# Patient Record
Sex: FEMALE | Race: OTHER | HISPANIC OR LATINO | URBAN - METROPOLITAN AREA
[De-identification: names, ages, dates, MRNs, and addresses within clinical notes are randomized per-mention and may not be internally consistent; named-entity substitution may affect disease eponyms.]

---

## 2017-10-05 ENCOUNTER — EMERGENCY (EMERGENCY)
Facility: HOSPITAL | Age: 32
LOS: 1 days | Discharge: HOME | End: 2017-10-05
Attending: EMERGENCY MEDICINE | Admitting: EMERGENCY MEDICINE
Payer: SELF-PAY

## 2017-10-05 VITALS
RESPIRATION RATE: 16 BRPM | TEMPERATURE: 98 F | OXYGEN SATURATION: 97 % | WEIGHT: 72.97 LBS | HEART RATE: 67 BPM | DIASTOLIC BLOOD PRESSURE: 57 MMHG | HEIGHT: 60 IN | SYSTOLIC BLOOD PRESSURE: 98 MMHG

## 2017-10-05 DIAGNOSIS — R10.32 LEFT LOWER QUADRANT PAIN: ICD-10-CM

## 2017-10-05 LAB
ALBUMIN SERPL ELPH-MCNC: 4.2 G/DL — SIGNIFICANT CHANGE UP (ref 3.3–5)
ALP SERPL-CCNC: 65 U/L — SIGNIFICANT CHANGE UP (ref 40–120)
ALT FLD-CCNC: 106 U/L — HIGH (ref 10–45)
ANION GAP SERPL CALC-SCNC: 15 MMOL/L — SIGNIFICANT CHANGE UP (ref 5–17)
APPEARANCE UR: CLEAR — SIGNIFICANT CHANGE UP
APTT BLD: 33.9 SEC — SIGNIFICANT CHANGE UP (ref 27.5–37.4)
AST SERPL-CCNC: 52 U/L — HIGH (ref 10–40)
BASOPHILS NFR BLD AUTO: 0.4 % — SIGNIFICANT CHANGE UP (ref 0–2)
BILIRUB SERPL-MCNC: 0.6 MG/DL — SIGNIFICANT CHANGE UP (ref 0.2–1.2)
BILIRUB UR-MCNC: NEGATIVE — SIGNIFICANT CHANGE UP
BUN SERPL-MCNC: 15 MG/DL — SIGNIFICANT CHANGE UP (ref 7–23)
CALCIUM SERPL-MCNC: 9.7 MG/DL — SIGNIFICANT CHANGE UP (ref 8.4–10.5)
CHLORIDE SERPL-SCNC: 102 MMOL/L — SIGNIFICANT CHANGE UP (ref 96–108)
CO2 SERPL-SCNC: 22 MMOL/L — SIGNIFICANT CHANGE UP (ref 22–31)
COLOR SPEC: YELLOW — SIGNIFICANT CHANGE UP
CREAT SERPL-MCNC: 0.65 MG/DL — SIGNIFICANT CHANGE UP (ref 0.5–1.3)
DIFF PNL FLD: (no result)
EOSINOPHIL NFR BLD AUTO: 5.8 % — SIGNIFICANT CHANGE UP (ref 0–6)
GLUCOSE SERPL-MCNC: 109 MG/DL — HIGH (ref 70–99)
GLUCOSE UR QL: NEGATIVE — SIGNIFICANT CHANGE UP
HCG SERPL-ACNC: <.1 MIU/ML — SIGNIFICANT CHANGE UP
HCT VFR BLD CALC: 39.8 % — SIGNIFICANT CHANGE UP (ref 34.5–45)
HGB BLD-MCNC: 13.6 G/DL — SIGNIFICANT CHANGE UP (ref 11.5–15.5)
INR BLD: 1.14 — SIGNIFICANT CHANGE UP (ref 0.88–1.16)
KETONES UR-MCNC: NEGATIVE — SIGNIFICANT CHANGE UP
LEUKOCYTE ESTERASE UR-ACNC: NEGATIVE — SIGNIFICANT CHANGE UP
LIDOCAIN IGE QN: 31 U/L — SIGNIFICANT CHANGE UP (ref 7–60)
LYMPHOCYTES # BLD AUTO: 32.2 % — SIGNIFICANT CHANGE UP (ref 13–44)
MAGNESIUM SERPL-MCNC: 1.9 MG/DL — SIGNIFICANT CHANGE UP (ref 1.6–2.6)
MCHC RBC-ENTMCNC: 29.3 PG — SIGNIFICANT CHANGE UP (ref 27–34)
MCHC RBC-ENTMCNC: 34.2 G/DL — SIGNIFICANT CHANGE UP (ref 32–36)
MCV RBC AUTO: 85.8 FL — SIGNIFICANT CHANGE UP (ref 80–100)
MONOCYTES NFR BLD AUTO: 7.2 % — SIGNIFICANT CHANGE UP (ref 2–14)
NEUTROPHILS NFR BLD AUTO: 54.4 % — SIGNIFICANT CHANGE UP (ref 43–77)
NITRITE UR-MCNC: NEGATIVE — SIGNIFICANT CHANGE UP
PH UR: 6 — SIGNIFICANT CHANGE UP (ref 5–8)
PLATELET # BLD AUTO: 147 K/UL — LOW (ref 150–400)
POTASSIUM SERPL-MCNC: 3.7 MMOL/L — SIGNIFICANT CHANGE UP (ref 3.5–5.3)
POTASSIUM SERPL-SCNC: 3.7 MMOL/L — SIGNIFICANT CHANGE UP (ref 3.5–5.3)
PROT SERPL-MCNC: 7.6 G/DL — SIGNIFICANT CHANGE UP (ref 6–8.3)
PROT UR-MCNC: NEGATIVE MG/DL — SIGNIFICANT CHANGE UP
PROTHROM AB SERPL-ACNC: 12.7 SEC — SIGNIFICANT CHANGE UP (ref 9.8–12.7)
RBC # BLD: 4.64 M/UL — SIGNIFICANT CHANGE UP (ref 3.8–5.2)
RBC # FLD: 12.4 % — SIGNIFICANT CHANGE UP (ref 10.3–16.9)
SODIUM SERPL-SCNC: 139 MMOL/L — SIGNIFICANT CHANGE UP (ref 135–145)
SP GR SPEC: <=1.005 — SIGNIFICANT CHANGE UP (ref 1–1.03)
UROBILINOGEN FLD QL: 0.2 E.U./DL — SIGNIFICANT CHANGE UP
WBC # BLD: 6.8 K/UL — SIGNIFICANT CHANGE UP (ref 3.8–10.5)
WBC # FLD AUTO: 6.8 K/UL — SIGNIFICANT CHANGE UP (ref 3.8–10.5)

## 2017-10-05 PROCEDURE — 76830 TRANSVAGINAL US NON-OB: CPT | Mod: 26

## 2017-10-05 PROCEDURE — 76856 US EXAM PELVIC COMPLETE: CPT | Mod: 26

## 2017-10-05 PROCEDURE — 99285 EMERGENCY DEPT VISIT HI MDM: CPT

## 2017-10-05 RX ORDER — IOHEXOL 300 MG/ML
50 INJECTION, SOLUTION INTRAVENOUS ONCE
Qty: 0 | Refills: 0 | Status: COMPLETED | OUTPATIENT
Start: 2017-10-05 | End: 2017-10-05

## 2017-10-05 RX ADMIN — IOHEXOL 50 MILLILITER(S): 300 INJECTION, SOLUTION INTRAVENOUS at 22:07

## 2017-10-05 NOTE — ED PROVIDER NOTE - ATTENDING CONTRIBUTION TO CARE
32F c/o LLQ abd pain for 3 months.  states worse today, +nausea, menses started today.  no fevers. no dysuria. no discharge  gen- nad  heent- ncat, clear conj  cv -rrr  lungs -ctab  abd - soft, nt, nd  ext -wwp, no edema  neuro -aox3, steady gait, paulino  no acute abd abnormalities on CT, no torsion on US, recommend GYN f/u

## 2017-10-05 NOTE — ED ADULT TRIAGE NOTE - CHIEF COMPLAINT QUOTE
pelvic pain for 3 month, radiates to back, "pain got worse today, nausea, menstrual period started today"  pt denies any PMH, no hx of surgery, reports recent unprotected intercourse, denies fever, no vomiting, no diarrhea, no fever, no dysuria

## 2017-10-05 NOTE — ED ADULT NURSE NOTE - OBJECTIVE STATEMENT
pt received into spot 2 A&Ox3 ambulatory appears comfortable, is Turkmen speaking only, a  phone was used ID number [id number] pt complaining of intermittent 5/10 lower pelvic pain x3 months that became worse today accompanied by pt's menstrual period that started today with some nausea no vomiting. Pt denies any CP SOB palpitations lightheadedness dizziness weakness fever chills pain/ burning with urination diarrhea constipation. Pt denies any PMH PSH or daily medications. Abd soft nontender nondistended. Pt reports she has had unprotected sex, denies any vaginal discharge. Pt provided cup for urine sample but states she just went to the trestroomand cannot go again right now

## 2017-10-05 NOTE — ED PROVIDER NOTE - MEDICAL DECISION MAKING DETAILS
radiological studies , labs and exam in ED with results relayed to patient ( patient concerned as had sono at another facility but apparently not getting results to nov )

## 2017-10-05 NOTE — ED PROVIDER NOTE - OBJECTIVE STATEMENT
exacerbation of months long left lower quadrant/ pelvic pain and thus to ED denies fever no abdominal surgeries + menses at present  one partner no vaginal d/c

## 2017-10-06 VITALS
RESPIRATION RATE: 18 BRPM | DIASTOLIC BLOOD PRESSURE: 63 MMHG | HEART RATE: 71 BPM | SYSTOLIC BLOOD PRESSURE: 118 MMHG | OXYGEN SATURATION: 98 %

## 2017-10-06 PROCEDURE — 85610 PROTHROMBIN TIME: CPT

## 2017-10-06 PROCEDURE — 76830 TRANSVAGINAL US NON-OB: CPT

## 2017-10-06 PROCEDURE — 74177 CT ABD & PELVIS W/CONTRAST: CPT | Mod: 26

## 2017-10-06 PROCEDURE — 83735 ASSAY OF MAGNESIUM: CPT

## 2017-10-06 PROCEDURE — 81001 URINALYSIS AUTO W/SCOPE: CPT

## 2017-10-06 PROCEDURE — 80053 COMPREHEN METABOLIC PANEL: CPT

## 2017-10-06 PROCEDURE — 83690 ASSAY OF LIPASE: CPT

## 2017-10-06 PROCEDURE — 99284 EMERGENCY DEPT VISIT MOD MDM: CPT | Mod: 25

## 2017-10-06 PROCEDURE — 74177 CT ABD & PELVIS W/CONTRAST: CPT

## 2017-10-06 PROCEDURE — 85025 COMPLETE CBC W/AUTO DIFF WBC: CPT

## 2017-10-06 PROCEDURE — 84702 CHORIONIC GONADOTROPIN TEST: CPT

## 2017-10-06 PROCEDURE — 85730 THROMBOPLASTIN TIME PARTIAL: CPT

## 2017-10-06 PROCEDURE — 96374 THER/PROPH/DIAG INJ IV PUSH: CPT | Mod: XU

## 2017-10-06 PROCEDURE — 76856 US EXAM PELVIC COMPLETE: CPT

## 2017-10-06 RX ORDER — KETOROLAC TROMETHAMINE 30 MG/ML
30 SYRINGE (ML) INJECTION ONCE
Qty: 0 | Refills: 0 | Status: DISCONTINUED | OUTPATIENT
Start: 2017-10-06 | End: 2017-10-06

## 2017-10-06 RX ADMIN — Medication 30 MILLIGRAM(S): at 01:55

## 2017-10-06 RX ADMIN — Medication 30 MILLIGRAM(S): at 01:30

## 2017-10-06 NOTE — ED ADULT NURSE REASSESSMENT NOTE - NS ED NURSE REASSESS COMMENT FT1
pt resting on stretcher appears comfortable, passed a clot as she is on her menstrual period. PA Seo aware pt also c/o 7/10 pelvic pain PA made aware pt medicated per orders will monitor and reassess, pt in NAD

## 2017-10-07 LAB
C TRACH RRNA SPEC QL NAA+PROBE: SIGNIFICANT CHANGE UP
N GONORRHOEA RRNA SPEC QL NAA+PROBE: SIGNIFICANT CHANGE UP
SPECIMEN SOURCE: SIGNIFICANT CHANGE UP

## 2018-05-02 ENCOUNTER — EMERGENCY (EMERGENCY)
Facility: HOSPITAL | Age: 33
LOS: 1 days | Discharge: HOME | End: 2018-05-02
Attending: EMERGENCY MEDICINE | Admitting: EMERGENCY MEDICINE
Payer: SELF-PAY

## 2018-05-02 VITALS
OXYGEN SATURATION: 98 % | HEART RATE: 63 BPM | RESPIRATION RATE: 18 BRPM | DIASTOLIC BLOOD PRESSURE: 62 MMHG | TEMPERATURE: 98 F | SYSTOLIC BLOOD PRESSURE: 112 MMHG | WEIGHT: 147.05 LBS

## 2018-05-02 DIAGNOSIS — N93.8 OTHER SPECIFIED ABNORMAL UTERINE AND VAGINAL BLEEDING: ICD-10-CM

## 2018-05-02 DIAGNOSIS — N93.9 ABNORMAL UTERINE AND VAGINAL BLEEDING, UNSPECIFIED: ICD-10-CM

## 2018-05-02 PROCEDURE — 99284 EMERGENCY DEPT VISIT MOD MDM: CPT | Mod: 25

## 2018-05-02 PROCEDURE — 76830 TRANSVAGINAL US NON-OB: CPT

## 2018-05-02 PROCEDURE — 76830 TRANSVAGINAL US NON-OB: CPT | Mod: 26

## 2018-05-02 PROCEDURE — 85025 COMPLETE CBC W/AUTO DIFF WBC: CPT

## 2018-05-02 PROCEDURE — 80048 BASIC METABOLIC PNL TOTAL CA: CPT

## 2018-05-02 PROCEDURE — 96374 THER/PROPH/DIAG INJ IV PUSH: CPT

## 2018-05-02 PROCEDURE — 99284 EMERGENCY DEPT VISIT MOD MDM: CPT

## 2018-05-02 PROCEDURE — 81001 URINALYSIS AUTO W/SCOPE: CPT

## 2018-05-02 PROCEDURE — 36415 COLL VENOUS BLD VENIPUNCTURE: CPT

## 2018-05-02 RX ORDER — KETOROLAC TROMETHAMINE 30 MG/ML
30 SYRINGE (ML) INJECTION ONCE
Qty: 0 | Refills: 0 | Status: DISCONTINUED | OUTPATIENT
Start: 2018-05-02 | End: 2018-05-02

## 2018-05-02 RX ADMIN — Medication 30 MILLIGRAM(S): at 23:21

## 2018-05-02 NOTE — ED PROVIDER NOTE - GENITOURINARY, MLM
nl ext genitalia, friable appearing cervix w blood at os, no cmt, mild r adnexal ttp w/o mass, mild uterine ttp, l adnexa nontender w/o mass

## 2018-05-02 NOTE — ED ADULT TRIAGE NOTE - CHIEF COMPLAINT QUOTE
pt complaining of abd pain x 1 week with vaginal bleeding and nausea no vomiting. Unsure when last menstrual period

## 2018-05-02 NOTE — ED PROVIDER NOTE - OBJECTIVE STATEMENT
32 yo female  c/o vaginal bleeding and pelvic pain.  Pt w dub in Feb, eval by gyn at that time and told it was hormonal.  Pt received biopsy, u/s and labs at that time but denies treatment w rx.  Pt reports bleeding stopped but recurred in late April and now since last night.  Pt denies h/o ovarian cyst, fibroids.  Pt reports h/o hpv but does not know if she's had a pap smear.  No h/o std, monogamous w asymptomatic partner.  No cp, palpitations, sob, dizziness/weakness.  No meds at home for 9/10 pelvic pain. No dysuria, freq, urgency, abnl discharge prior to bleeding.

## 2018-05-02 NOTE — ED PROVIDER NOTE - MEDICAL DECISION MAKING DETAILS
Pt c/o vag bleeding and pelvic pain w h/o recent dub.  ? dub, ? cervical issue since cervix friable on exam, ? std (low risk), ? ovarian cyst, doubt fibroid since no enlarged uterus on exam.  Plan pain meds, labs, u/s; likely dc to fu as outpt w gyn for repeat eval, pap, etc.

## 2018-05-03 LAB
ANION GAP SERPL CALC-SCNC: 12 MMOL/L — SIGNIFICANT CHANGE UP (ref 5–17)
APPEARANCE UR: (no result)
BACTERIA # UR AUTO: PRESENT /HPF
BASOPHILS NFR BLD AUTO: 0.5 % — SIGNIFICANT CHANGE UP (ref 0–2)
BILIRUB UR-MCNC: (no result)
BUN SERPL-MCNC: 17 MG/DL — SIGNIFICANT CHANGE UP (ref 7–23)
CALCIUM SERPL-MCNC: 10.1 MG/DL — SIGNIFICANT CHANGE UP (ref 8.4–10.5)
CHLORIDE SERPL-SCNC: 103 MMOL/L — SIGNIFICANT CHANGE UP (ref 96–108)
CO2 SERPL-SCNC: 26 MMOL/L — SIGNIFICANT CHANGE UP (ref 22–31)
COLOR SPEC: YELLOW — SIGNIFICANT CHANGE UP
CREAT SERPL-MCNC: 0.77 MG/DL — SIGNIFICANT CHANGE UP (ref 0.5–1.3)
DIFF PNL FLD: (no result)
EOSINOPHIL NFR BLD AUTO: 1.9 % — SIGNIFICANT CHANGE UP (ref 0–6)
EPI CELLS # UR: (no result) /HPF (ref 0–5)
GLUCOSE SERPL-MCNC: 100 MG/DL — HIGH (ref 70–99)
GLUCOSE UR QL: NEGATIVE — SIGNIFICANT CHANGE UP
HCT VFR BLD CALC: 40.3 % — SIGNIFICANT CHANGE UP (ref 34.5–45)
HGB BLD-MCNC: 13.2 G/DL — SIGNIFICANT CHANGE UP (ref 11.5–15.5)
KETONES UR-MCNC: (no result) MG/DL
LEUKOCYTE ESTERASE UR-ACNC: NEGATIVE — SIGNIFICANT CHANGE UP
LYMPHOCYTES # BLD AUTO: 36.9 % — SIGNIFICANT CHANGE UP (ref 13–44)
MCHC RBC-ENTMCNC: 27.4 PG — SIGNIFICANT CHANGE UP (ref 27–34)
MCHC RBC-ENTMCNC: 32.8 G/DL — SIGNIFICANT CHANGE UP (ref 32–36)
MCV RBC AUTO: 83.8 FL — SIGNIFICANT CHANGE UP (ref 80–100)
MONOCYTES NFR BLD AUTO: 6.4 % — SIGNIFICANT CHANGE UP (ref 2–14)
NEUTROPHILS NFR BLD AUTO: 54.3 % — SIGNIFICANT CHANGE UP (ref 43–77)
NITRITE UR-MCNC: NEGATIVE — SIGNIFICANT CHANGE UP
PH UR: 5 — SIGNIFICANT CHANGE UP (ref 5–8)
PLATELET # BLD AUTO: 190 K/UL — SIGNIFICANT CHANGE UP (ref 150–400)
POTASSIUM SERPL-MCNC: 3.8 MMOL/L — SIGNIFICANT CHANGE UP (ref 3.5–5.3)
POTASSIUM SERPL-SCNC: 3.8 MMOL/L — SIGNIFICANT CHANGE UP (ref 3.5–5.3)
PROT UR-MCNC: 100 MG/DL
RBC # BLD: 4.81 M/UL — SIGNIFICANT CHANGE UP (ref 3.8–5.2)
RBC # FLD: 14 % — SIGNIFICANT CHANGE UP (ref 10.3–16.9)
RBC CASTS # UR COMP ASSIST: (no result) /HPF
SODIUM SERPL-SCNC: 141 MMOL/L — SIGNIFICANT CHANGE UP (ref 135–145)
SP GR SPEC: >=1.03 — SIGNIFICANT CHANGE UP (ref 1–1.03)
UROBILINOGEN FLD QL: 0.2 E.U./DL — SIGNIFICANT CHANGE UP
WBC # BLD: 7.3 K/UL — SIGNIFICANT CHANGE UP (ref 3.8–10.5)
WBC # FLD AUTO: 7.3 K/UL — SIGNIFICANT CHANGE UP (ref 3.8–10.5)
WBC UR QL: < 5 /HPF — SIGNIFICANT CHANGE UP

## 2019-07-29 NOTE — ED ADULT TRIAGE NOTE - AS O2 DELIVERY
[Patient reported mammogram was normal] : Patient reported mammogram was normal [Patient reported PAP Smear was normal] : Patient reported PAP Smear was normal [Language] : difficulty with language [Learning/Retaining New Information] : difficulty learning/retaining new information [Reasoning] : difficulty with reasoning [Handling Complex Tasks] : difficulty handling complex tasks [Spatial Ability and Orientation] : difficulty with spatial ability and orientation [With Family] : lives with family [College] : College [] :  [Feels Safe at Home] : Feels safe at home [Fully functional (bathing, dressing, toileting, transferring, walking, feeding)] : Fully functional (bathing, dressing, toileting, transferring, walking, feeding) [Fully functional (using the telephone, shopping, preparing meals, housekeeping, doing laundry, using] : Fully functional and needs no help or supervision to perform IADLs (using the telephone, shopping, preparing meals, housekeeping, doing laundry, using transportation, managing medications and managing finances) [Smoke Detector] : smoke detector [Carbon Monoxide Detector] : carbon monoxide detector [Safety elements used in home] : safety elements used in home [Seat Belt] :  uses seat belt [Behavior] : denies difficulty with behavior [Change in mental status noted] : No change in mental status noted [Guns at Home] : no guns at home [Reports changes in dental health] : Reports no changes in dental health [PapSmearDate] : 6/18 [MammogramDate] : 2/18 room air

## 2021-09-07 ENCOUNTER — EMERGENCY (EMERGENCY)
Facility: HOSPITAL | Age: 36
LOS: 1 days | Discharge: HOME | End: 2021-09-07
Attending: EMERGENCY MEDICINE | Admitting: EMERGENCY MEDICINE
Payer: MEDICAID

## 2021-09-07 VITALS
TEMPERATURE: 97 F | HEART RATE: 67 BPM | DIASTOLIC BLOOD PRESSURE: 64 MMHG | RESPIRATION RATE: 18 BRPM | HEIGHT: 60 IN | SYSTOLIC BLOOD PRESSURE: 99 MMHG | WEIGHT: 139.99 LBS | OXYGEN SATURATION: 100 %

## 2021-09-07 VITALS — SYSTOLIC BLOOD PRESSURE: 105 MMHG | DIASTOLIC BLOOD PRESSURE: 65 MMHG

## 2021-09-07 DIAGNOSIS — M54.9 DORSALGIA, UNSPECIFIED: ICD-10-CM

## 2021-09-07 DIAGNOSIS — R07.89 OTHER CHEST PAIN: ICD-10-CM

## 2021-09-07 DIAGNOSIS — U07.1 COVID-19: ICD-10-CM

## 2021-09-07 LAB — SARS-COV-2 RNA SPEC QL NAA+PROBE: SIGNIFICANT CHANGE UP

## 2021-09-07 PROCEDURE — U0003: CPT

## 2021-09-07 PROCEDURE — 71045 X-RAY EXAM CHEST 1 VIEW: CPT

## 2021-09-07 PROCEDURE — U0005: CPT

## 2021-09-07 PROCEDURE — 99283 EMERGENCY DEPT VISIT LOW MDM: CPT | Mod: 25

## 2021-09-07 PROCEDURE — 93005 ELECTROCARDIOGRAM TRACING: CPT

## 2021-09-07 PROCEDURE — 93010 ELECTROCARDIOGRAM REPORT: CPT

## 2021-09-07 PROCEDURE — 71045 X-RAY EXAM CHEST 1 VIEW: CPT | Mod: 26

## 2021-09-07 PROCEDURE — 99285 EMERGENCY DEPT VISIT HI MDM: CPT | Mod: 25

## 2021-09-07 RX ORDER — ACETAMINOPHEN 500 MG
1000 TABLET ORAL ONCE
Refills: 0 | Status: COMPLETED | OUTPATIENT
Start: 2021-09-07 | End: 2021-09-07

## 2021-09-07 RX ORDER — SODIUM CHLORIDE 9 MG/ML
1000 INJECTION INTRAMUSCULAR; INTRAVENOUS; SUBCUTANEOUS ONCE
Refills: 0 | Status: DISCONTINUED | OUTPATIENT
Start: 2021-09-07 | End: 2021-09-07

## 2021-09-07 RX ADMIN — Medication 1000 MILLIGRAM(S): at 20:08

## 2021-09-07 NOTE — ED PROVIDER NOTE - NSFOLLOWUPINSTRUCTIONS_ED_ALL_ED_FT
COVID-19 (CORONAVIRUS DISEASE 2019) - General Information           COVID-19 (Enfermedad por coronavirus 2019)    LO QUE NECESITA SABER:    ¿Qué necesito saber acerca de la enfermedad por coronavirus 2019 (COVID-19)?La COVID-19 es la enfermedad causada por el nuevo coronavirus descubierto por primera vez en diciembre de 2019. Los coronavirus generalmente causan infecciones de las vías respiratorias superiores (nariz, garganta y pulmones), lester un resfriado. El nuevo virus se propaga rápida y fácilmente. El virus puede transmitirse a partir de 2 días antes de que comiencen los síntomas. El virus también waller cambiado en varias formas nuevas (llamadas variantes) desde que se descubrió. Las variantes pueden ser más contagiosas (se propagan fácilmente) que la forma original. Además, algunas pueden causar katelynn enfermedad más grave que otras. Es importante seguir las medidas locales, nacionales y mundiales para protegerse y proteger a los demás de la infección.    ¿Cuáles son los signos y síntomas de la COVID-19?Es posible que no presente ningún signo o síntoma. Los signos y síntomas suelen empezar unos 5 días después de la infección gilbert pueden tardar de 2 a 14 días. Puede sentir lester si tuviera gripe o un resfriado john. Algunos signos y síntomas desaparecen en unos pocos días. Otros pueden durar semanas, meses o posiblemente años. La información sobre COVID-19 todavía se está aprendiendo. Dígale a lopes médico si parveen que se ha infectado gilbert desarrolla signos o síntomas que no se enumeran a continuación:  •Tos      •Falta de aliento o dificultad para respirar que puede llegar a ser grave      •Katelynn fiebre de, al menos, 100.4 °F, o 38 °C (puede ser más baja en los adultos de 65 años o más)      •Escalofríos que pueden incluir temblores      •Dolor muscular, yue corporales o dolor de brina      •El dolor de garganta      •De repente, no ser capaz de probar u oler nada      •Sensación de cansancio físico y mental (fatiga)      •Congestión (de la nariz y la brina) o flujo nasal      •Diarrea, náuseas o vómitos      ¿Cómo se diagnostica la COVID-19?Llame a lopes médico si piensa que puede tener COVID-19. Le dirá lo que debe hacer basándose en maria a síntomas y en las pautas de pruebas de loeps norberto. En general, se puede utilizar lo siguiente:   •Un examen viralmuestra si tiene katelynn infección actualmente. Se rox muestras de la nariz y la garganta, usualmente con hisopos. Es posible que tenga que esperar varios días para obtener los resultados de la prueba. Lopes médico le dirá cómo obtener los resultados. Tendrá que ponerse en cuarentena (mantenerse físicamente alejado de los demás) hasta que obtenga los resultados. Si los resultados muestran que tiene COVID-19, tendrá que continuar hasta que esté pilar. Lopes médico u otro oficial de freddie pueden darle más instrucciones. También tendrá que prevenir otra infección hasta que se sepa si puede contraer COVID-19 de nuevo.      •Katelynn prueba de anticuerposmuestra si tuvo katelynn infección en el pasado. Para esta prueba se utilizan muestras de lakeisha. Los anticuerpos son producidos por el sistema inmunitario para atacar el virus que causa la COVID-19. Los anticuerpos se formarán de 1 a 3 semanas después de que se contagie. No se sabe si los anticuerpos previenen katelynn segunda infección, o por cuánto tiempo katelynn persona podría estar protegida. Si tiene anticuerpos, tendrá que tener cuidado con los demás hasta que se sepa más.      •Tomografías o radiografíaspodrían realizarse para comprobar si existen signos de neumonía. El coronavirus 2019 causa un tipo específico de neumonía, generalmente en ambos pulmones. Las imágenes también pueden utilizarse para comprobar si tiene problemas médicos en otras partes del cuerpo.      ¿Cómo se trata la COVID-19?El tratamiento, lester los anticuerpos monoclonales y el plasma de convaleciente, casillas recibido autorización de uso de emergencia (EUA). [place] significa que podrían administrarse solamente a pacientes hospitalizados con signos y síntomas graves. Se puede usar lo siguiente para controlar maria a síntomas:   •Los síntomas levespodrían mejorar por sí solos. Si no necesita ser tratado en un hospital, se le darán instrucciones para que siga en lopes casa. Controlarán atentamente lopes estado. Deberá estar atento al empeoramiento de los síntomas y buscar atención inmediata si es necesario. Hable con lopes médico acerca de lo siguiente:?Los descongestivosayudan a reducir la congestión nasal y ayudan a respirar más fácilmente. Si abigail pastillas descongestivas, pueden causarle agitación o problemas para dormir. No use aerosol descongestionante por más de unos cuantos días.      ?Los jarabes para la tosayudan a reducir la tos. Pregúntele a lopes médico cuál tipo de medicamento para la tos es mejor para usted.      ?Para aliviar el dolor de garganta,kai gárgaras con agua salada tibia, o use pastillas para la garganta o un aerosol para la garganta. Celine más líquidos para disolver y aflojar la mucosidad y para prevenir la deshidratación.      ?Los RAJANI o el acetaminofenopueden ayudar a bajar la fiebre y aliviar los yue corporales o el dolor de brina. Siga las indicaciones. Si no se rox correctamente, los RAJANI pueden causar sangrado estomacal o daño renal y el acetaminofeno puede dañar hepático.      •Los síntomas severos o potencialmente mortalesse tratan en el hospital. Es posible que usted necesite katelynn combinación de los siguientes:?Los medicamentospueden administrarse para reducir o prevenir la inflamación o combatir el virus. También podría necesitar anticoagulantes para prevenir o tratar los coágulos de lakeisha. Si tiene trombosis venosa profunda (TVP) o embolia pulmonar (PE), isis vez necesite seguir usando anticoagulantes kameron 3 meses.      ?El oxígeno adicionalpodría administrarse si tiene insuficiencia respiratoria. [place] significa que los pulmones no pueden llevar suficiente oxígeno a la lakeisha y a los órganos. El oxígeno extra puede ayudar a prevenir la insuficiencia orgánica.      ?Un respiradorpodría usarse para ayudarlo a respirar.        ¿Qué lucia saber sobre los problemas de freddie que puede causar el virus?Los problemas de freddie graves pueden mejorar o continuar kameron semanas, meses y posiblemente años. Los problemas de freddie que se prolongan pueden denominarse COVID persistente. Cualquier persona puede desarrollar problemas graves a causa del nuevo virus, gilbert el riesgo es mayor si tiene 65 años o más. Un sistema inmunitario débil, la diabetes o katelynn enfermedad cardíaca o pulmonar también pueden aumentar el riesgo. El riesgo también es mayor si usted es o ha sido fumador de cigarrillos. La COVID-19 puede austin lugar a cualquiera de las siguientes situaciones:  •Síndrome multisistémico inflamatorio en adultos (MIS-A) o en niños (MIS-C), que causa inflamación en el corazón, el sistema digestivo, la piel o el cerebro      •Afecciones respiratorias inferiores graves, lester la neumonía o el síndrome de dificultad respiratoria aguda (SDRA).      •Daño a los vasos sanguíneos, lo que provoca coágulos de lakeisha      •Daños en los órganos por falta de oxígeno o por coágulos de lakeisha      •Problemas para dormir      •Problemas para pensar claramente, para recordar información o para concentrarse      •Cambios en el estado de ánimo, depresión o ansiedad      ¿Cómo se propaga el coronavirus 2019?A continuación se indican las formas en que se parveen que se propaga el virus, gilbert es posible que surja más información:   •Las gotitas son la principal forma de propagación de todos los coronavirus.El virus viaja en las gotitas que se hellen cuando katelynn persona habla, tose o estornuda. Las gotitas también pueden flotar en el aire por minutos u horas. La infección se produce cuando respira las gotitas o cuando le tocan los ojos o la nariz. El contacto personal cercano con katelynn persona infectada aumenta el riesgo de infección. [place] significa estar a menos de 6 pies (2 metros) de distancia de la persona kameron al menos 15 minutos en 24 horas.      •El contacto de persona a persona puede propagar el virus.Por ejemplo, katelynn persona con el virus en maria a marcellus puede propagarlo al darle la mano a alguien.      •El virus puede permanecer en objetos y superficies kameron un breve período.Puede infectarse si toca el objeto o la superficie y luego se toca los ojos o la boca.      •Un animal infectado puede ser capaz de infectar a katelynn persona que lo toque.[place] puede ocurrir en mercados vivos o en katelynn bob.      ¿Cómo puede ayudar a reducir el riesgo de COVID-19?La mejor manera de prevenir la infección es evitar a cualquiera que esté infectado, gilbert esto puede ser difícil de lograr. Katelynn persona infectada puede propagar el virus antes de que aparezcan los signos o síntomas, o incluso si los signos o síntomas nunca se desarrollan. Lo siguiente puede ayudar a reducir el riesgo de infección:     Prevenga la infección por COVID-19     •Lávese las marcellus con frecuencia kameron el día.Utilice agua y jabón. Frótese las marcellus enjabonadas, entrelazando los dedos, kameron al menos 20 segundos. Enjuáguese con agua corriente caliente. Séquese las marcellus con katelynn toalla limpia o katelynn toalla de papel. Puede usar un desinfectante para marcellus que contenga alcohol, si no hay agua y jabón disponibles. Enseñe a los niños a lavarse las marcellus y a usar el desinfectante de marcellus.  Lavado de marcellus           •Cúbrase al toser y estornudar.Gire la armida y cúbrase la boca y la nariz con un pañuelo. Deseche el pañuelo. Use el ángulo del brazo si no tiene un pañuelo disponible. Luego lávese las marcellus con agua y jabón o use un desinfectante de marcellus. Enséñeles a los niños a cubrirse al toser o estornudar.      •Use un tapabocas (mascarilla) cuando esté cerca de alguien que no vive en lopes casa.Utilice un tapabocas de david con al menos 2 capas. También puede crear capas colocando un tapabocas de david sobre katelynn máscara no médica desechable. Cúbrase la boca y la nariz. El tapabocas debe ajustarse pilar al navarro de la nariz. Asegúrelo debajo de la barbilla y a los lados de la armida. No use un protector facial de plástico en lugar de un tapabocas. Continúe con el distanciamiento social y lávese las marcellus a menudo. El tapabocas no es un sustituto de otras medidas de seguridad de distanciamiento social.  Cómo usar un tapabocas (mascarilla)           •Siga las pautas de distanciamiento social a nivel local, nacional y mundial.Mantenga al menos 6 pies (2 metros) de distancia entre usted y los demás. También mantenga esta distancia de cualquiera que venga a lopes casa, lester alguien que kai katelynn entrega. Use un tapabocas cuando esté cerca de otros. Deberá usar un tapabocas en restaurantes, tiendas y otros edificios públicos. También necesitará usar un tapabocas en el transporte público, lester el autobús, el metro o el avión. Recuerde que debe utilizar un tapabocas de material grueso o usar 2 tapabocas juntos.      •Acostúmbrese a no tocarse la armida.Si tiene el virus en las marcellus, puede transferirlo a los ojos, la nariz o la boca e infectarse. También puede transferirlo a los objetos, las superficies o las personas. No se toque los ojos, la nariz o la boca sin antes lavarse las marcellus.      •Limpie y desinfecte a menudo los objetos y las superficies de alto contacto.Use toallitas húmedas desinfectantes o kai katelynn solución de 4 cucharaditas de lejía en 1 cuarto de galón (4 tazas) de agua. Limpie y desinfecte aunque piense que nadie que viva o haya entrado en lopes casa esté infectado con el virus.      •Pregunte sobre las vacunas que pudiera necesitar.Reciba la vacuna contra la COVID-19 cuando esté disponible para usted. Las vacunas actuales se administran en forma de inyección en 1 o 2 dosis. Debe recibir la vacuna contra la influenza (gripe) tan pronto lester se lo recomienden cada año, generalmente en septiembre u octubre. Vacúnese contra la neumonía si se recomienda.      ¿Cómo sigo las pautas de distanciamiento social para ayudar a reducir el riesgo de COVID-19?Las normas de distanciamiento social nacionales y locales varían. Las reglas pueden cambiar con el tiempo a medida que se levantan las restricciones. Las restricciones pueden volver a aplicarse si se produce un brote en el lugar donde usted vive. Es importante conocer y seguir todas las reglas de distanciamiento social actuales en lopes área. Las siguientes son reglas generales al respecto:  •Quédese en casa si está enfermo o parveen que puede tener COVID-19.Es importante que se quede en casa si está esperando katelynn ariel para katelynn prueba o los resultados de katelynn prueba. Incluso si no tiene síntomas, puede transmitir el virus a otros.      •Limite los viajes fuera de lopes casa.Kai que le entreguen los alimentos y otros suministros en la chrystal o en otra área, de ser posible. Planifique los viajes fuera de lopes casa para que kai el rowena número de paradas posibles para limitar el contacto personal cercano. [place] nota de los lugares que visita. [place] ayudará a que los rastreadores de contacto notifiquen a otros si usted se infecta.      •Evite el contacto físico cercano con nadie que no viva en lopes casa.No le dé la mano, abrace o bese a katelynn persona lester saludo. Si tiene que usar el transporte público (lester el autobús o el metro), intente sentarse o pararse lejos de los demás. Permita que ingresen a lopes casa solamente las personas necesarias. Use el tapabocas y recuérdeles a los demás que usen un tapabocas. Recuérdeles que se laven las marcellus cuando lleguen y antes de irse. No permita el ingreso de nadie a lopes casa ni vaya usted a otra casa solo de visita. Aunque ninguno se sienta enfermos, el virus puede contagiarse de katelynn persona a otra.      •Evite las reuniones en persona y las multitudes.Las reuniones o multitudes de 10 o más individuos pueden hacer que el virus se propague. Evite los lugares lester parques, playas, eventos deportivos y atracciones turísticas. De ser posible, asista a las fiestas, las comidas festivas, los servicios religiosos y las conferencias en forma virtual (a través de katelynn computadora).      •Pregunte a lopes médico de qué otra forma puede tener las citas.Algunos médicos ofrecen citas por teléfono, video u otros tipos de citas. También puede obtener recetas de maria a medicamentos para varios meses de katelynn vez.      •Manténgase a jaquelin si debe que salir a trabajar.Mantenga la distancia física entre usted y los demás trabajadores tanto lester sea posible. Siga las reglas de lopes empleador para que todos estén a jaquelin.      ¿Qué lucia hacer si tengo COVID-19 y me estoy recuperando en casa?Los médicos le darán instrucciones específicas que debe seguir. Las siguientes son pautas generales para recordarle cómo mantener a los demás a jaquelin hasta que usted esté pilar:   •Lávese las marcellus frecuentemente.Use agua y jabón tanto lester sea posible. Puede usar un desinfectante para marcellus que contenga alcohol, si no hay agua y jabón disponibles. Séquese las marcellus con katelynn toalla limpia o katelynn toalla de papel. No comparta toallas con nadie. Si usa toallas de papel, deséchelas en un cubo de basura recubierto que se guarda en lopes habitación o área. Use un cubo de basura cubierto, si es posible.      •No salga de lopes casa a menos que sea necesario.Pídale a katelynn persona no infectada que le compre los comestibles o insumos, o kai que se los entreguen. No acuda al consultorio del médico sin katelynn ariel.      •Solo tenga un contacto físico cercano con katelynn persona que lo cuide directamente, o con un bebé o lisseth que deba cuidar.Los miembros de la aury y los amigos no deben visitarlo. Si es posible, quédese en un área o habitación separada de lopes casa si vive con otras personas. Nadie debe entrar en el área o en la habitación excepto para brindarle cuidados. Puede visitar a los demás por teléfono, videochat, correo electrónico o sistemas similares.      •Use un tapabocas mientras haya otras personas cerca de usted.[place] puede ayudar a evitar que las gotitas propaguen el virus cuando usted habla, estornuda o tose. Póngase el tapabocas antes de que la persona entre en lopes habitación o área. Recuérdele a la persona que se cubra la nariz y la boca antes de entrar a brindarle cuidados.      •No comparta artículos.No comparta platos, toallas ni otros artículos con nadie. Los artículos deben ser lavados después de usarlos.      •Proteja a lopes bebé.Algunos recién nacidos casillas dado positivo para el virus. Se desconoce si se infectaron antes o después del nacimiento. El riesgo más alto es cuando el recién nacido tiene contacto cercano con katelynn persona infectada. Si está embarazada o amamantando, hable con lopes médico u obstetra sobre cualquier preocupación que tenga. También le dirá cuándo debe traer a lopes bebé para los chequeos y las vacunas. También le dirá qué hacer si parveen que lopes bebé está infectado con el coronavirus. Lávese las marcellus y colóquese un tapabocas limpio antes de amamantar o cuidar al bebé.      •No manipule animales vivos a menos que sea necesario.Algunos animales, incluyendo las mascotas, casillas sido infectados con el nuevo coronavirus. Pídale a alguien que no esté infectado que cuide de lopes mascota hasta que usted esté pilar. Si debe cuidar a katelynn mascota, usa un tapabocas. Lávese las marcellus antes y después de cuidar a lopes mascota. Hable con lopes médico sobre cómo mantener seguro a un animal de servicio, si es necesario.      •Siga las instrucciones de lopes médico para estar cerca de los demás después de recuperarse.No se sabe si katelynn persona recuperada puede transmitir el virus a otros, ni por cuánto tiempo. Lopes médico puede darle instrucciones, lester continuar con el distanciamiento social y usar un tapabocas. Le dirá cuándo podrá volver a estar con otras personas. [place] podría ser de 10 a 20 javed después del inicio de los síntomas o si tuvo katelynn prueba positiva. La mayoría de los síntomas también deberán desaparecer. Lopes médico le proporcionará más información.      ¿Dónde puedo obtener más información?  •Centers for Disease Control and Prevention  [address] [place] [id number]  Phone: [phone]  Web Address: http://www.cdc.gov        ¿Qué lucia hacer si pienso que yo o alguien que conozco está infectado?Kai lo siguiente para proteger a otras personas:   •Si se requiere atención de emergencia,avise al operador de la posible infección, o llame antes y avise al servicio de urgencias.      •Llame a un médicopara recibir instrucciones si los síntomas son leves. Cualquier persona que pueda estar infectada no debe llegar sin llamar noreen. El médico deberá proteger a los miembros del personal y a otros pacientes.      •La persona que puede estar infectada debe usar un tapabocasmientras reciben atención médica. [place] ayudará a reducir el riesgo de infectar a otras personas. Nadie que sea rowena de 2 años, que tenga problemas respiratorios o que no pueda quitárselo debe usar un tapabocas. El médico puede darle instrucciones para cualquier persona que no pueda usar un tapabocas.      Llame al número local de emergencias (911 en los [place]) o al departamento de emergencias si:  •Usted tiene dificultad para respirar o falta de aliento mientras descansa.      •Usted siente presión o dolor en el pecho que dura más de 5 minutos.      •Usted tiene confusión o es difícil despertarlo.      •Maria A labios o armida están azules.      •Usted tiene fiebre de 104 °F (40 °C) o más.      ¿Cuándo lucia llamar a mi médico?  •No tiene síntomas de COVID-19 gilbert tuvo contacto físico cercano dentro de los 14 días con alguien que adan positivo.      •Usted tiene preguntas o inquietudes acerca de lopes condición o cuidado.      ACUERDOS SOBRE LOPES CUIDADO:    Usted tiene el derecho de ayudar a planear olpes cuidado. Aprenda todo lo que pueda sobre lopes condición y lester darle tratamiento. Discuta maria a opciones de tratamiento con maria a médicos para decidir el cuidado que usted desea recibir. Usted siempre tiene el derecho de rechazar el tratamiento.       © Copyright [employer] 2021           back to top                          © Copyright [employer] 2021

## 2021-09-07 NOTE — ED PROVIDER NOTE - OBJECTIVE STATEMENT
36 F co chest ache, body aches, legs, arms, back, headache- loss of taste x 1 day- no f/c  no covid vax- no sig pmh  no ho covid  no n/v, tolerating po  mild-moderate severity  lmp 3 weeks ago

## 2021-09-07 NOTE — ED PROVIDER NOTE - PATIENT PORTAL LINK FT
You can access the FollowMyHealth Patient Portal offered by [facility] by registering at the following website: [email or web address]. By joining [employer]’s FollowMyHealth portal, you will also be able to view your health information using other applications (apps) compatible with our system.

## 2021-09-07 NOTE — ED PROVIDER NOTE - CARE PLAN
1 Principal Discharge DX:	Chest pain  Secondary Diagnosis:	2019 novel coronavirus disease (COVID-19)

## 2021-09-07 NOTE — ED ADULT TRIAGE NOTE - CHIEF COMPLAINT QUOTE
Pt c/o chest pain, headache, and palpitations since last night. No known cardiac hx. Denies shortness of breath, fevers, cough.

## 2021-09-07 NOTE — ED ADULT NURSE NOTE - TEMPLATE LIST FOR HEAD TO TOE ASSESSMENT
REVIEW OF SYSTEMS:  GENERAL:  Patient denies fevers, chills, night sweats, excessive fatigue, anorexia, weight loss, weakness, hot flashes  EYES:  Patient denies blurred vision, double vision, pain, burning & itching.   NEUROLOGIC:  Patient denies headache, dizziness, numbness, tingling, loss of balance, insomnia.  GASTROINTESTINAL:  Patient denies nausea, vomiting, diarrhea, abdominal pain, constipation, blood in stool, indigestion/heartburn.  CARDIOVASCULAR:  Patient denies chest pain, palpitations.  SKIN:  Patient denies skin rashes, bruising, persistent itching.  MUSCULOSKELETAL:  Patient denies bone pain, leg and ankle swelling, but complains of:  joint pain.  ENT/MOUTH:  Patient denies dry mouth, sores on tongue, mouth sores, sinus drainage, hearing loss, but complains of:  dysphagia and sore throat  :  Patient denies urgency, painful urination, urinary frequency, blood in urine, nocturia  RESPIRATORY:  Patient denies wheezing, shortness of breath, but complains of frequent  Dry  cough.  PSYCH: Patient denies anxiety, depression   Communicable/Infectious

## 2022-01-06 ENCOUNTER — EMERGENCY (EMERGENCY)
Facility: HOSPITAL | Age: 37
LOS: 1 days | Discharge: HOME | End: 2022-01-06
Attending: EMERGENCY MEDICINE | Admitting: EMERGENCY MEDICINE
Payer: MEDICAID

## 2022-01-06 VITALS
HEART RATE: 69 BPM | RESPIRATION RATE: 18 BRPM | WEIGHT: 154.98 LBS | OXYGEN SATURATION: 98 % | SYSTOLIC BLOOD PRESSURE: 105 MMHG | DIASTOLIC BLOOD PRESSURE: 65 MMHG | TEMPERATURE: 98 F | HEIGHT: 60 IN
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
## 2022-01-06 DIAGNOSIS — M25.472 EFFUSION, LEFT ANKLE: ICD-10-CM

## 2022-01-06 DIAGNOSIS — M25.572 PAIN IN LEFT ANKLE AND JOINTS OF LEFT FOOT: ICD-10-CM
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
## 2022-01-06 PROCEDURE — 73610 X-RAY EXAM OF ANKLE: CPT | Mod: 26,LT

## 2022-01-06 PROCEDURE — 73590 X-RAY EXAM OF LOWER LEG: CPT | Mod: 26,LT

## 2022-01-06 PROCEDURE — 73610 X-RAY EXAM OF ANKLE: CPT

## 2022-01-06 PROCEDURE — 73630 X-RAY EXAM OF FOOT: CPT | Mod: 26,LT

## 2022-01-06 PROCEDURE — 99284 EMERGENCY DEPT VISIT MOD MDM: CPT

## 2022-01-06 PROCEDURE — 73630 X-RAY EXAM OF FOOT: CPT

## 2022-01-06 PROCEDURE — 99284 EMERGENCY DEPT VISIT MOD MDM: CPT | Mod: 25

## 2022-01-06 PROCEDURE — 73590 X-RAY EXAM OF LOWER LEG: CPT

## 2022-01-06 RX ORDER — IBUPROFEN 200 MG
600 TABLET ORAL ONCE
Refills: 0 | Status: COMPLETED | OUTPATIENT
Start: 2022-01-06 | End: 2022-01-06

## 2022-01-06 RX ADMIN — Medication 600 MILLIGRAM(S): at 00:58

## 2022-01-06 NOTE — ED PROVIDER NOTE - NSFOLLOWUPINSTRUCTIONS_ED_ALL_ED_FT
PLEASE FOLLOW-UP WITH ORTHOPAEDICS IN 5-7 DAYS IF PERSISTENT/WORSENING SYMPTOMS.    ANY XRAY IMAGING RESULTS GIVEN IN THE EMERGENCY DEPARTMENT ARE PRELIMINARY UNTIL FORMALLY READ BY A RADIOLOGIST. YOU WILL BE CONTACTED IF THERE ARE ANY SIGNIFICANT CHANGES IN THE FINAL READ.    PLEASE RETURN TO THE EMERGENCY DEPARTMENT IF SEVERE ANKLE PAIN/SWELLING, UNABLE TO BEAR WEIGHT, FEVER, CHEST PAIN, SHORTNESS OF BREATH, ABDOMINAL PAIN, VOMITING, OTHER CONCERNING SYMPTOMS.    PLEASE CONTACT ZBIGNIEW WILHELM ([facility] EMERGENCY DEPARTMENT CLINICAL REFERRAL COORDINATOR) TO ASSIST IN SCHEDULING YOUR FOLLOW-UP APPOINTMENT.    Monday - Friday 11am-7pm  [phone]  [email or web address]

## 2022-01-06 NOTE — ED ADULT NURSE NOTE - SUICIDE SCREENING QUESTION 3
From: Ashley Lucas  To: Dr. Fair Me: 1/6/2022 9:46 AM EST  Subject: MRI denied     I received a letter from [id number] saying that my insurance company denied my MRI. I reached out to a Marcela S from your department about this situation almost a year ago in late February. As she was going to look into it. I have heard nothing. Im being told I have to pay the full amount of $1389 I believe is the correct amount. This date was 2/05/21. Can someone please help me out with this.     Thank you,  Chandler Donahue No

## 2022-01-06 NOTE — ED PROVIDER NOTE - OBJECTIVE STATEMENT
36F otherwise healthy/no Rx, c/o acute L ankle pain s/p inversion injury on icey ground earlier today, now w/ progressive pain/swelling since having to work on it. has not yet taken any pain medication. no prior injury.

## 2022-01-06 NOTE — ED PROVIDER NOTE - PATIENT PORTAL LINK FT
You can access the FollowMyHealth Patient Portal offered by [facility] by registering at the following website: [email or web address]. By joining [facility]’s FollowMyHealth portal, you will also be able to view your health information using other applications (apps) compatible with our system.

## 2022-01-06 NOTE — ED ADULT NURSE NOTE - OBJECTIVE STATEMENT
Patient to the E with complaint of L foot injury and pain.  Patient endorsed that she tripped and fell on icy street yesterday morning twisting ankle.

## 2022-01-06 NOTE — ED PROVIDER NOTE - MDM ORDERS SUBMITTED SELECTION
Imaging Studies/Medications Bactrim Counseling:  I discussed with the patient the risks of sulfa antibiotics including but not limited to GI upset, allergic reaction, drug rash, diarrhea, dizziness, photosensitivity, and yeast infections.  Rarely, more serious reactions can occur including but not limited to aplastic anemia, agranulocytosis, methemoglobinemia, blood dyscrasias, liver or kidney failure, lung infiltrates or desquamative/blistering drug rashes.

## 2022-01-06 NOTE — ED PROVIDER NOTE - PHYSICAL EXAMINATION
CONST: nontoxic NAD speaking in full sentences  EXT: +ttp bl malleoli w/ assoc swelling/ecchymoses, +ttp overlying diffuse metatarsals w/o assoc skin changes, +mild +ttp overlying proximal fibula w/o assoc skin changes, FROM, symmetric distal pulses intact  SKIN: warm, dry, cap refill <2sec  NEURO: a+ox3, 5/5 strength x4, gross sensation intact x4, antalgic gait

## 2022-09-28 ENCOUNTER — EMERGENCY (EMERGENCY)
Facility: HOSPITAL | Age: 37
LOS: 1 days | Discharge: HOME | End: 2022-09-28
Attending: EMERGENCY MEDICINE | Admitting: EMERGENCY MEDICINE
Payer: MEDICAID

## 2022-09-28 VITALS
HEART RATE: 66 BPM | SYSTOLIC BLOOD PRESSURE: 110 MMHG | RESPIRATION RATE: 16 BRPM | HEIGHT: 60 IN | DIASTOLIC BLOOD PRESSURE: 66 MMHG | TEMPERATURE: 98 F | WEIGHT: 121.25 LBS | OXYGEN SATURATION: 96 %

## 2022-09-28 DIAGNOSIS — R10.30 LOWER ABDOMINAL PAIN, UNSPECIFIED: ICD-10-CM

## 2022-09-28 DIAGNOSIS — N30.90 CYSTITIS, UNSPECIFIED WITHOUT HEMATURIA: ICD-10-CM

## 2022-09-28 DIAGNOSIS — R00.2 PALPITATIONS: ICD-10-CM

## 2022-09-28 LAB
APPEARANCE UR: ABNORMAL
BACTERIA # UR AUTO: PRESENT /HPF
BILIRUB UR-MCNC: NEGATIVE — SIGNIFICANT CHANGE UP
COLOR SPEC: YELLOW — SIGNIFICANT CHANGE UP
DIFF PNL FLD: NEGATIVE — SIGNIFICANT CHANGE UP
EPI CELLS # UR: ABNORMAL /HPF (ref 0–5)
GLUCOSE UR QL: NEGATIVE — SIGNIFICANT CHANGE UP
KETONES UR-MCNC: NEGATIVE — SIGNIFICANT CHANGE UP
LEUKOCYTE ESTERASE UR-ACNC: ABNORMAL
NITRITE UR-MCNC: NEGATIVE — SIGNIFICANT CHANGE UP
PH UR: 6 — SIGNIFICANT CHANGE UP (ref 5–8)
PROT UR-MCNC: ABNORMAL MG/DL
RBC CASTS # UR COMP ASSIST: < 5 /HPF — SIGNIFICANT CHANGE UP
SP GR SPEC: >=1.03 — SIGNIFICANT CHANGE UP (ref 1–1.03)
UROBILINOGEN FLD QL: 1 E.U./DL — SIGNIFICANT CHANGE UP
WBC UR QL: ABNORMAL /HPF

## 2022-09-28 PROCEDURE — 87086 URINE CULTURE/COLONY COUNT: CPT

## 2022-09-28 PROCEDURE — 81001 URINALYSIS AUTO W/SCOPE: CPT

## 2022-09-28 PROCEDURE — 99283 EMERGENCY DEPT VISIT LOW MDM: CPT

## 2022-09-28 PROCEDURE — 87184 SC STD DISK METHOD PER PLATE: CPT

## 2022-09-28 PROCEDURE — 99284 EMERGENCY DEPT VISIT MOD MDM: CPT

## 2022-09-28 RX ORDER — CEFUROXIME AXETIL 250 MG
1 TABLET ORAL
Qty: 20 | Refills: 0
Start: 2022-09-28 | End: 2022-10-07

## 2022-09-28 RX ORDER — CEPHALEXIN 500 MG
500 CAPSULE ORAL
Refills: 0 | Status: DISCONTINUED | OUTPATIENT
Start: 2022-09-28 | End: 2022-10-02

## 2022-09-28 RX ORDER — ACETAMINOPHEN 500 MG
650 TABLET ORAL ONCE
Refills: 0 | Status: COMPLETED | OUTPATIENT
Start: 2022-09-28 | End: 2022-09-28

## 2022-09-28 RX ADMIN — Medication 500 MILLIGRAM(S): at 21:22

## 2022-09-28 RX ADMIN — Medication 650 MILLIGRAM(S): at 18:16

## 2022-09-28 NOTE — ED PROVIDER NOTE - IV ALTEPLASE EXCL REL HIDDEN
Problem: Patient Care Overview  Goal: Plan of Care Review  Outcome: Ongoing (interventions implemented as appropriate)  Patient on RA with sats as documented.  Will continue to monitor.          show

## 2022-09-28 NOTE — ED PROVIDER NOTE - NSFOLLOWUPINSTRUCTIONS_ED_ALL_ED_FT
Infección urinaria en los adultos    Urinary Tract Infection, Adult       Katelynn infección urinaria (IU) puede ocurrir en cualquier lugar de las vías urinarias. Las vías urinarias incluyen lo siguiente:  •Los riñones.      •Los uréteres.      •La vejiga.      •La uretra.      Estos órganos fabrican, almacenan y eliminan el pis (orina) del cuerpo.      ¿Cuáles son las causas?    La causa de esta infección es la presencia de gérmenes (bacterias) en la norberto genital. Estos gérmenes proliferan y causan hinchazón (inflamación) de las vías urinarias.      ¿Qué incrementa el riesgo?    Los siguientes factores pueden hacer que sea más propensa a contraer esta afección:  •Usar un tubo jimenez y pequeño (catéter) para drenar el pis.      •Imposibilidad de controlar el momento de orinar o defecar (incontinencia).    •Ser rosibel. Si usted es rosibel, estas cosas pueden aumentar el riesgo:•Usar estos métodos para evitar un embarazo:  •Un medicamento que crow los espermatozoides (espermicida).      •Un dispositivo que impide el paso de los espermatozoides (diafragma).        •Tener niveles bajos de katelynn hormona femenina (estrógeno).      •Estar embarazada.        Es más probable que sufra esta afección si:  •Tiene genes que aumentan araiza riesgo.      •Es sexualmente activa.      •Cecilia antibióticos.     •Tiene dificultad para orinar debido a:  •Araiza próstata es más jennifer de lo normal, si usted es hombre.      •Obstrucción en la parte del cuerpo que drena el pis de la vejiga.      •Cálculo renal.       •Un trastorno nervioso que afecta la vejiga.      •No sirisha katelynn cantidad suficiente de líquido.       •No hace pis con la frecuencia suficiente.      •Tiene otras afecciones, lester:  •Diabetes.       •Un sistema que combate las enfermedades (sistema inmunitario) debilitado.      •Anemia drepanocítica.       •Gota.       •Lesión en la columna vertebral.          ¿Cuáles son los signos o síntomas?    Los síntomas de esta afección incluyen:  •Necesidad inmediata de hacer pis.      •Hacer poca cantidad de pis con mucha frecuencia.      •Dolor o ardor al hacer pis.      •Shiva en el pis.      •Pis que huele mal o anormal.      •Dificultad para hacer pis.      •Pis turbio.      •Líquido que sale de la vagina, si es rosibel.      •Dolor en la angel o en la parte baja de la espalda.      Otros síntomas pueden incluir los siguientes:  •Vómitos.      •Falta de apetito.      •Sentirse confundido (confuso). Lori puede ser el primer síntoma en los adultos mayores.      •Sentirse cansado y malhumorado (irritable).      •Fiebre.      •Materia fecal líquida (diarrea).        ¿Cómo se trata?    •Recibir antibióticos.      •Recibir otros medicamentos.      •Beber katelynn cantidad suficiente de agua.      En algunos casos, es posible que deba consultar a un especialista.      Siga estas instrucciones en araiza casa:     Medicamentos     •Use los medicamentos de venta alejandra y los recetados solamente lester se lo haya indicado el médico.      •Si le recetaron un antibiótico, tómelo lester se lo haya indicado el médico. No deje de tomarlo aunque comience a sentirse mejor.      Instrucciones generales   •Asegúrese de hacer lo siguiente:  •Kai pis hasta que la vejiga quede vacía.       •No contenga el pis kameron mucho tiempo.      •Vaciar la vejiga después de tener sexo.      •Límpiese de adelante hacia atrás después de hacer pis o defecar, si es rosibel. Use cada trozo de papel higiénico solo katelynn vez cuando se limpie.        •Celine suficiente líquido lester para mantener la orina de color amarillo pálido.      •Cumpla con todas las visitas de seguimiento.        Comuníquese con un médico si:    •No mejora después de 1 o 2 días de tratamiento.      •Los síntomas desaparecen y luego reaparecen.        Solicite ayuda de inmediato si:    •Tiene un dolor muy intenso en la espalda.      •Tiene dolor muy intenso en la parte baja de la angel.      •Tiene fiebre.      •Tiene escalofríos.      •Se siente lester si fuera a vomitar o vomita.        Resumen    •Katelynn infección urinaria (IU) puede ocurrir en cualquier lugar de las vías urinarias.      •Esta afección es causada por la presencia de gérmenes en la norberto genital.      •Existen muchos factores de riesgo de sufrir katelynn IU.      •El tratamiento incluye antibióticos.      •Celine suficiente líquido lester para mantener la orina de color amarillo pálido.      Esta información no tiene lester fin reemplazar el consejo del médico. Asegúrese de hacerle al médico cualquier pregunta que tenga.      Document Revised: 10/29/2021 Document Reviewed: 10/29/2021    Elsevier Patient Education © 2022 [employer]

## 2022-09-28 NOTE — ED PROVIDER NOTE - OBJECTIVE STATEMENT
37 F abd pain- crampy lower abd pain w burning on urination no vag bleeding/dc lmp 9/14  no n/v umer po- felt palpitations earlier today  no f/c no flank pain  mod severity

## 2022-09-28 NOTE — ED ADULT TRIAGE NOTE - CHIEF COMPLAINT QUOTE
x 1 week of lower abdominal pain, nausea, and burning with urination. denies vaginal bleeding, hematuria, or vomiting. PT also reports chest pain x 2 days. no PMH.

## 2022-09-28 NOTE — ED PROVIDER NOTE - PATIENT PORTAL LINK FT
You can access the FollowMyHealth Patient Portal offered by [facility] by registering at the following website: [email or web address]. By joining [facility]’s FollowMyHealth portal, you will also be able to view your health information using other applications (apps) compatible with our system.

## 2022-09-30 LAB
-  CLINDAMYCIN: SIGNIFICANT CHANGE UP
-  LEVOFLOXACIN: SIGNIFICANT CHANGE UP
-  PENICILLIN: SIGNIFICANT CHANGE UP
-  VANCOMYCIN: SIGNIFICANT CHANGE UP
CULTURE RESULTS: SIGNIFICANT CHANGE UP
METHOD TYPE: SIGNIFICANT CHANGE UP
ORGANISM # SPEC MICROSCOPIC CNT: SIGNIFICANT CHANGE UP
ORGANISM # SPEC MICROSCOPIC CNT: SIGNIFICANT CHANGE UP
SPECIMEN SOURCE: SIGNIFICANT CHANGE UP

## 2022-12-01 NOTE — ED PROVIDER NOTE - CROS ED ROS STATEMENT
Doing well, no new RT/RD seen on exam. LOW VISION SECONDARY TO ADVANCED GLAUCOMA. Advised pt that will have some visual disturbances due to [address]. all other ROS negative except as per HPI

## 2024-08-22 NOTE — ED PROVIDER NOTE - CLINICAL SUMMARY MEDICAL DECISION MAKING FREE TEXT BOX
avss. xrays w/o acute fx/dislocation. neurovasc intact. s/p ibuprofen and ace wrap. pt declining crutches at this time. will dc w/ outpatient ortho fu prn. strict return precautions. pt agrees w/ plan. questions answered. verbal cues/1 person assist

## 2025-02-03 NOTE — ED ADULT NURSE NOTE - NSFALLRSKOUTCOME_ED_ALL_ED
Addended by: LIZANDRO NAVARRETE on: 2/3/2025 09:28 AM     Modules accepted: Orders     Universal Safety Interventions